# Patient Record
Sex: MALE | Race: WHITE | NOT HISPANIC OR LATINO | Employment: OTHER | URBAN - METROPOLITAN AREA
[De-identification: names, ages, dates, MRNs, and addresses within clinical notes are randomized per-mention and may not be internally consistent; named-entity substitution may affect disease eponyms.]

---

## 2019-05-01 ENCOUNTER — ANCILLARY PROCEDURE (OUTPATIENT)
Dept: ULTRASOUND IMAGING | Facility: CLINIC | Age: 64
End: 2019-05-01
Payer: COMMERCIAL

## 2019-05-01 ENCOUNTER — OFFICE VISIT (OUTPATIENT)
Dept: FAMILY MEDICINE | Facility: CLINIC | Age: 64
End: 2019-05-01
Payer: COMMERCIAL

## 2019-05-01 VITALS
TEMPERATURE: 99 F | DIASTOLIC BLOOD PRESSURE: 78 MMHG | OXYGEN SATURATION: 95 % | RESPIRATION RATE: 16 BRPM | HEART RATE: 90 BPM | WEIGHT: 198.6 LBS | SYSTOLIC BLOOD PRESSURE: 124 MMHG

## 2019-05-01 DIAGNOSIS — K21.9 GASTROESOPHAGEAL REFLUX DISEASE, ESOPHAGITIS PRESENCE NOT SPECIFIED: ICD-10-CM

## 2019-05-01 DIAGNOSIS — N50.89 MASS OF RIGHT TESTICLE: ICD-10-CM

## 2019-05-01 DIAGNOSIS — I10 ESSENTIAL HYPERTENSION: Primary | ICD-10-CM

## 2019-05-01 DIAGNOSIS — Z12.5 SCREENING FOR PROSTATE CANCER: ICD-10-CM

## 2019-05-01 DIAGNOSIS — E78.49 OTHER HYPERLIPIDEMIA: ICD-10-CM

## 2019-05-01 PROCEDURE — 99203 OFFICE O/P NEW LOW 30 MIN: CPT | Performed by: FAMILY MEDICINE

## 2019-05-01 PROCEDURE — 76870 US EXAM SCROTUM: CPT | Mod: TC | Performed by: FAMILY MEDICINE

## 2019-05-01 RX ORDER — ASPIRIN 81 MG/1
81 TABLET ORAL DAILY
COMMUNITY
End: 2019-06-04 | Stop reason: ALTCHOICE

## 2019-05-01 RX ORDER — ATORVASTATIN CALCIUM 20 MG/1
20 TABLET, FILM COATED ORAL NIGHTLY
COMMUNITY
Start: 2018-05-16 | End: 2019-06-04 | Stop reason: SDUPTHER

## 2019-05-01 RX ORDER — OMEPRAZOLE 20 MG/1
20 TABLET, DELAYED RELEASE ORAL EVERY MORNING
COMMUNITY
End: 2019-06-04 | Stop reason: SDUPTHER

## 2019-05-01 RX ORDER — PSYLLIUM HUSK 0.4 G
600 CAPSULE ORAL
COMMUNITY
End: 2020-05-20 | Stop reason: ALTCHOICE

## 2019-05-01 RX ORDER — LISINOPRIL AND HYDROCHLOROTHIAZIDE 12.5; 2 MG/1; MG/1
1 TABLET ORAL DAILY
COMMUNITY
Start: 2018-05-10 | End: 2019-06-04 | Stop reason: SDUPTHER

## 2019-05-01 ASSESSMENT — PAIN SCALES - GENERAL: PAINLEVEL: 0-NO PAIN

## 2019-05-01 NOTE — PROGRESS NOTES
Testicular ultrasounds are normal.  What we are feeling is likely just epididymis.  No further evaluation necessary

## 2019-05-01 NOTE — PROGRESS NOTES
Subjective      Presley Syed is a 63 y.o. male who presents to establish care.    HPI     Patient presents to clinic to establish care. Patient is originally from [place]. He recently moved to [place]. He has been retired for 6 years. 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 He does spend his garza in [place], [place]. He has a health history consisting of hyperlipidemia, hypertension, and GERD. He also has a history of DJD of both hips and his lower back. Patient was a former smoker and he smoked 1 PPD until 1983 when his wife told him that she was pregnant. Patient does drink alcohol and estimates that he drinks 6 alcoholic beverages per week. Patient's surgical history consists of a cholecystectomy, appendectomy, bilateral inguinal hernia repair with mesh, and right cataract surgery. Patient's father passed away in his 50's due to an industrial accident. His mom had a history of stroke and passed away in her 70's. He does have siblings with diagnoses of arthritis as well. He was diagnosed with skin cancer of his nose and had it removed. He was told that it was not melanoma. He does follow with dermatology in [place], [place] yearly.    Patient does have a concern regarding a lump on his right testicle that he noticed a few months ago. Patient denies any tenderness of his right testicle and states that it has not grown in the past few months. Patient reports that when he was in his 40's he had a lump on his left testicle and was told that he had a blood vessel that burst and dried. He denies any penile discharge, dysuria, hematuria, or pain with ejaculation. He also has been sick with a cold since Saturday. Patient's son and daughter-in-law were recently on vacation in [place] and got sick with colds when they got home. Patient went to visit them and did get sick after that. He has been coughing and experiencing upper respiratory symptoms. His son and daughter-in-law are now feeling better. Patient has  been attempting to stay hydrated. Patient wakes up experiencing nocturia at least 2 times per night.       The following have been reviewed and updated as appropriate in this visit:         Allergies not on file  Current Outpatient Medications   Medication Sig Dispense Refill   • calcium carbonate (calcium carbonate) 600 mg calcium (1,500 mg) tablet Take 600 mg by mouth     • MULTIVITAMIN ORAL Take 1 tablet by mouth     • LUTEIN ORAL Take 20 mg by mouth     • atorvastatin (LIPITOR) 20 mg tablet Take 20 mg by mouth nightly     • KRILL OIL ORAL Take 1 capsule by mouth     • lisinopril-hydrochlorothiazide (PRINZIDE,ZESTORETIC) 20-12.5 mg per tablet Take 1 tablet by mouth daily     • omeprazole OTC (PriLOSEC OTC) 20 mg EC tablet Take 20 mg by mouth every morning     • aspirin 81 mg EC tablet Take 81 mg by mouth daily       No current facility-administered medications for this visit.      Past Medical History:   Diagnosis Date   • Cancer (CMS/HCC) (HCC)     skin cancer of nose at age 42   • Cataract     right   • GERD (gastroesophageal reflux disease)    • Hemorrhoids    • Hyperlipidemia    • Hypertension      Past Surgical History:   Procedure Laterality Date   • APPENDECTOMY      age 14   • CHOLECYSTECTOMY  2015   • EYE SURGERY Right 2019    cataract   • HERNIA REPAIR Bilateral 2017     No family history on file.  Social History     Socioeconomic History   • Marital status:      Spouse name: None   • Number of children: None   • Years of education: None   • Highest education level: None   Social Needs   • Financial resource strain: None   • Food insecurity - worry: None   • Food insecurity - inability: None   • Transportation needs - medical: None   • Transportation needs - non-medical: None   Occupational History   • None   Tobacco Use   • Smoking status: Former Smoker     Packs/day: 1.00     Types: Cigarettes     Last attempt to quit:      Years since quittin.3   • Smokeless tobacco: Never Used    Substance and Sexual Activity   • Alcohol use: Yes     Comment: 6 drinks/week   • Drug use: Never   • Sexual activity: Yes     Partners: Female   Other Topics Concern   • None   Social History Narrative   • None       Review of Systems  See HPI  Objective   /78 (BP Location: Right arm, Patient Position: Sitting, Cuff Size: Reg)   Pulse 90   Temp 37.2 °C (99 °F) (Temporal)   Resp 16   Wt 90.1 kg (198 lb 9.6 oz)   SpO2 95%     Physical Exam     GEN: Alert, NAD  HEENT: normocephalic, atraumatic, EACs and TMs intact, MM moist, non-erythematous pharynx  RESP: clear lung sounds  CV: regular rate and rhythm  ABD: soft, non-tender, bowel sounds positive, no hepatosplenomegaly  : no enlarged inguinal lymph nodes, non-tender testicles, contour of testicles is normal, fullness/enlargement and increased density of right epididymis to palpation, no inguinal hernias bilaterally  PSYCH: mood is good, affect is appropriate    ASSESSMENT AND PLAN   Diagnoses and all orders for this visit:    Essential hypertension  -     CBC w/auto differential Blood, Venous; Future  -     Comprehensive metabolic panel Blood, Venous; Future    Other hyperlipidemia  -     Lipid panel Blood, Venous; Future    Gastroesophageal reflux disease, esophagitis presence not specified    Mass of right testicle  -     US testicles; Future    Screening for prostate cancer  -     PSA screen Blood, Venous; Future      1.  Essential hypertension: Blood pressure under excellent control with current medications, continue current meds  2.  History of skin cancer: Patient reports history of nonmelanoma skin cancer and follows with dermatology in [facility]  3.  Gastroesophageal reflux disease: Patient takes omeprazole 20 mg daily.  Patient reports good control of symptoms with current medication continue current meds  4.  Mass of the right testicle: Patient has fullness/enlargement to examination of the right epididymis.  No tenderness.  Will obtain  ultrasound for further evaluation of this finding  Return for [facility] in 1 month with labs as ordered prior

## 2019-05-10 ENCOUNTER — OFFICE VISIT (OUTPATIENT)
Dept: FAMILY MEDICINE | Facility: CLINIC | Age: 64
End: 2019-05-10
Payer: COMMERCIAL

## 2019-05-10 VITALS — HEART RATE: 114 BPM | DIASTOLIC BLOOD PRESSURE: 70 MMHG | OXYGEN SATURATION: 95 % | SYSTOLIC BLOOD PRESSURE: 120 MMHG

## 2019-05-10 DIAGNOSIS — J01.00 ACUTE NON-RECURRENT MAXILLARY SINUSITIS: Primary | ICD-10-CM

## 2019-05-10 PROCEDURE — 99213 OFFICE O/P EST LOW 20 MIN: CPT | Performed by: FAMILY MEDICINE

## 2019-05-10 RX ORDER — AMOXICILLIN 500 MG/1
1000 CAPSULE ORAL 2 TIMES DAILY
Qty: 30 CAPSULE | Refills: 0 | Status: SHIPPED | OUTPATIENT
Start: 2019-05-10 | End: 2019-06-04 | Stop reason: ALTCHOICE

## 2019-05-10 RX ORDER — PREDNISOLONE SODIUM PHOSPHATE 15 MG/5ML
45 SOLUTION ORAL ONCE
Status: COMPLETED | OUTPATIENT
Start: 2019-05-10 | End: 2019-05-10

## 2019-05-10 RX ADMIN — PREDNISOLONE SODIUM PHOSPHATE 45 MG: 15 SOLUTION ORAL at 11:07

## 2019-05-10 NOTE — PROGRESS NOTES
Office Visit Progress Note  Subjective      Presley Syed is a 63 y.o. male who presents for respiratory symptoms for 3 weeks.    HPI he was traveling and visited some sick family members around April 20 and felt that ever since that time he is come down with something that includes symptoms of head congestion nasal drainage postnasal drip cough phlegm.  He has not had high fevers or bad sore throat or earache.  He has no history of particular seasonal reactions and is not sneezing or itching.  He smoked a little bit many years ago but he does not have any history of asthma or emphysema.  He does get some chest discomfort from coughing so much at night on the drainage.  He feels that the Mucinex he is taken has helped clear out a little bit of the drainage but the problem does not resolve.  No recent antibiotic use.          Review of Systems  Items reviewed this visit:        Objective   /70   Pulse 114   SpO2 95%     PHYS EXAM he is alert and cooperative no acute distress.  TMs are clear and shiny.  Nasal memories are swollen and reddened with some thick white discharge.  Oropharynx looks clear.  Tender over the cheekbones.  No adenopathy in the neck.  Chest without rales or wheezes.  O2 sat is normal.  Heart rhythm regular          Assessment/Plan     Diagnoses and all orders for this visit:    Acute non-recurrent maxillary sinusitis  -     prednisoLONE (ORAPRED) 15 mg/5 mL (5 mL) solution 45 mg  -     amoxicillin (AMOXIL) 500 mg capsule; Take 2 capsules (1,000 mg total) by mouth 2 (two) times a day      Given the duration of time in the presence of some facial tenderness it seems likely that there is some sinus infection but is not resolving spontaneously.  My recommendation would be to take a course of amoxicillin thousand twice daily for a week and a dose of prednisone 45 mg once here in the clinic to try to reduce airway inflammation.  He can keep trying some symptomatic things over-the-counter  but my hope is that if we can get an underlying infection will help him to express resolution from the entire syndrome.  Recheck as needed failure to improve and consideration of a more potent antibiotic if he develops worse symptoms  SULY CHO MD

## 2019-05-31 ENCOUNTER — LAB (OUTPATIENT)
Dept: LAB | Facility: CLINIC | Age: 64
End: 2019-05-31
Payer: COMMERCIAL

## 2019-05-31 DIAGNOSIS — I10 ESSENTIAL HYPERTENSION: ICD-10-CM

## 2019-05-31 DIAGNOSIS — Z12.5 SCREENING FOR PROSTATE CANCER: ICD-10-CM

## 2019-05-31 DIAGNOSIS — E78.49 OTHER HYPERLIPIDEMIA: ICD-10-CM

## 2019-05-31 LAB
ALBUMIN SERPL-MCNC: 4.3 G/DL (ref 3.5–5.3)
ALP SERPL-CCNC: 64 U/L (ref 45–115)
ALT SERPL-CCNC: 17 U/L (ref 0–52)
ANION GAP SERPL CALC-SCNC: 14 MMOL/L (ref 3–11)
AST SERPL-CCNC: 35 U/L (ref 0–39)
BASOPHILS # BLD AUTO: 0.1 10*3/UL
BASOPHILS NFR BLD AUTO: 1 % (ref 0–2)
BILIRUB SERPL-MCNC: 0.99 MG/DL (ref 0–1.4)
BUN SERPL-MCNC: 22 MG/DL (ref 7–25)
CALCIUM ALBUM COR SERPL-MCNC: 9.4 MG/DL (ref 8.6–10.3)
CALCIUM SERPL-MCNC: 9.6 MG/DL (ref 8.6–10.3)
CHLORIDE SERPL-SCNC: 107 MMOL/L (ref 98–107)
CHOLEST SERPL-MCNC: 152 MG/DL (ref 0–199)
CO2 SERPL-SCNC: 20 MMOL/L (ref 21–32)
CREAT SERPL-MCNC: 1.12 MG/DL (ref 0.7–1.3)
EOSINOPHIL # BLD AUTO: 0.3 10*3/UL
EOSINOPHIL NFR BLD AUTO: 6 % (ref 0–3)
ERYTHROCYTE [DISTWIDTH] IN BLOOD BY AUTOMATED COUNT: 13.2 % (ref 11.5–15)
FASTING STATUS PATIENT QL REPORTED: YES
GFR SERPL CREATININE-BSD FRML MDRD: 71 ML/MIN/1.73M*2
GLUCOSE SERPL-MCNC: 96 MG/DL (ref 70–105)
HCT VFR BLD AUTO: 46.9 % (ref 38–50)
HDLC SERPL-MCNC: 64 MG/DL
HGB BLD-MCNC: 15.5 G/DL (ref 13.2–17.2)
LDLC SERPL CALC-MCNC: 74 MG/DL (ref 20–99)
LYMPHOCYTES # BLD AUTO: 1.3 10*3/UL
LYMPHOCYTES NFR BLD AUTO: 25 % (ref 15–47)
MCH RBC QN AUTO: 29.7 PG (ref 29–34)
MCHC RBC AUTO-ENTMCNC: 33 G/DL (ref 32–36)
MCV RBC AUTO: 89.8 FL (ref 82–97)
MONOCYTES # BLD AUTO: 0.5 10*3/UL
MONOCYTES NFR BLD AUTO: 9 % (ref 5–13)
NEUTROPHILS # BLD AUTO: 3.2 10*3/UL
NEUTROPHILS NFR BLD AUTO: 59 % (ref 46–70)
PLATELET # BLD AUTO: 234 10*3/UL (ref 130–350)
PMV BLD AUTO: 7.7 FL (ref 6.9–10.8)
POTASSIUM SERPL-SCNC: 3.8 MMOL/L (ref 3.5–5.1)
PROT SERPL-MCNC: 7.1 G/DL (ref 6–8.3)
PSA SERPL-MCNC: 0.34 NG/ML (ref 0–4)
RBC # BLD AUTO: 5.22 10*6/ΜL (ref 4.1–5.8)
SODIUM SERPL-SCNC: 141 MMOL/L (ref 135–145)
TRIGL SERPL-MCNC: 69 MG/DL
WBC # BLD AUTO: 5.4 10*3/UL (ref 3.7–9.6)

## 2019-05-31 PROCEDURE — 80061 LIPID PANEL: CPT | Performed by: FAMILY MEDICINE

## 2019-05-31 PROCEDURE — 36415 COLL VENOUS BLD VENIPUNCTURE: CPT | Performed by: FAMILY MEDICINE

## 2019-05-31 PROCEDURE — 85025 COMPLETE CBC W/AUTO DIFF WBC: CPT | Performed by: FAMILY MEDICINE

## 2019-05-31 PROCEDURE — 84153 ASSAY OF PSA TOTAL: CPT | Performed by: FAMILY MEDICINE

## 2019-05-31 PROCEDURE — 80053 COMPREHEN METABOLIC PANEL: CPT | Performed by: FAMILY MEDICINE

## 2019-06-04 ENCOUNTER — OFFICE VISIT (OUTPATIENT)
Dept: FAMILY MEDICINE | Facility: CLINIC | Age: 64
End: 2019-06-04
Payer: COMMERCIAL

## 2019-06-04 ENCOUNTER — LAB (OUTPATIENT)
Dept: LAB | Facility: CLINIC | Age: 64
End: 2019-06-04
Payer: COMMERCIAL

## 2019-06-04 VITALS
OXYGEN SATURATION: 97 % | SYSTOLIC BLOOD PRESSURE: 118 MMHG | HEART RATE: 84 BPM | RESPIRATION RATE: 16 BRPM | WEIGHT: 199.4 LBS | TEMPERATURE: 98.6 F | DIASTOLIC BLOOD PRESSURE: 78 MMHG

## 2019-06-04 DIAGNOSIS — R89.9 ABNORMAL LABORATORY TEST: ICD-10-CM

## 2019-06-04 DIAGNOSIS — K21.9 GASTROESOPHAGEAL REFLUX DISEASE, ESOPHAGITIS PRESENCE NOT SPECIFIED: ICD-10-CM

## 2019-06-04 DIAGNOSIS — I10 ESSENTIAL HYPERTENSION: ICD-10-CM

## 2019-06-04 DIAGNOSIS — Z00.00 ENCOUNTER FOR ROUTINE ADULT HEALTH EXAMINATION WITHOUT ABNORMAL FINDINGS: ICD-10-CM

## 2019-06-04 DIAGNOSIS — E78.49 OTHER HYPERLIPIDEMIA: ICD-10-CM

## 2019-06-04 DIAGNOSIS — R89.9 ABNORMAL LABORATORY TEST: Primary | ICD-10-CM

## 2019-06-04 LAB
ANION GAP SERPL CALC-SCNC: 9 MMOL/L (ref 3–11)
BUN SERPL-MCNC: 17 MG/DL (ref 7–25)
CALCIUM SERPL-MCNC: 9.6 MG/DL (ref 8.6–10.3)
CHLORIDE SERPL-SCNC: 106 MMOL/L (ref 98–107)
CO2 SERPL-SCNC: 26 MMOL/L (ref 21–32)
CREAT SERPL-MCNC: 1.05 MG/DL (ref 0.7–1.3)
GFR SERPL CREATININE-BSD FRML MDRD: 76 ML/MIN/1.73M*2
GLUCOSE SERPL-MCNC: 144 MG/DL (ref 70–105)
POTASSIUM SERPL-SCNC: 3.6 MMOL/L (ref 3.5–5.1)
SODIUM SERPL-SCNC: 141 MMOL/L (ref 135–145)

## 2019-06-04 PROCEDURE — 99396 PREV VISIT EST AGE 40-64: CPT | Performed by: FAMILY MEDICINE

## 2019-06-04 PROCEDURE — 80048 BASIC METABOLIC PNL TOTAL CA: CPT | Performed by: FAMILY MEDICINE

## 2019-06-04 PROCEDURE — 36415 COLL VENOUS BLD VENIPUNCTURE: CPT | Performed by: FAMILY MEDICINE

## 2019-06-04 RX ORDER — LISINOPRIL AND HYDROCHLOROTHIAZIDE 12.5; 2 MG/1; MG/1
1 TABLET ORAL DAILY
Qty: 90 TABLET | Refills: 3 | Status: SHIPPED | OUTPATIENT
Start: 2019-06-04 | End: 2020-05-20 | Stop reason: SDUPTHER

## 2019-06-04 RX ORDER — ATORVASTATIN CALCIUM 20 MG/1
20 TABLET, FILM COATED ORAL NIGHTLY
Qty: 90 TABLET | Refills: 3 | Status: SHIPPED | OUTPATIENT
Start: 2019-06-04 | End: 2020-05-20 | Stop reason: SDUPTHER

## 2019-06-04 RX ORDER — OMEPRAZOLE 20 MG/1
20 TABLET, DELAYED RELEASE ORAL EVERY MORNING
Qty: 90 TABLET | Refills: 3 | Status: SHIPPED | OUTPATIENT
Start: 2019-06-04

## 2019-06-04 ASSESSMENT — PAIN SCALES - GENERAL: PAINLEVEL: 0-NO PAIN

## 2019-06-04 NOTE — PROGRESS NOTES
Subjective      Presley Syed is a 63 y.o. male who presents for an annual physical exam.    HPI     Patient states that he is feeling well today. He is taking Prinzide for hypertension. He denies any concerns regarding his blood pressure. He states that his GERD is well-controlled with omeprazole. He did have labs drawn on 5/31/19. His eosinophils were slightly elevated at 6%. Patient does have seasonal/environmental allergies. He was also found to have CO2 of 20 mmol/L and anion gap of 14 mmol/L. He is in agreement with redrawing a BMP to check these values.    Patient's PSA resulted at 0.34 ng/mL. Patient states that his paternal uncle may have had prostate cancer. Patient experiences nocturia 1-2 times per night. Patient denies any straining to start his urinary stream. He is up-to-date on vaccinations including Tdap which was administered in 2011 per records from [place]. He is not due for a colonoscopy until next year (2020).     The following have been reviewed and updated as appropriate in this visit:         No Known Allergies  Current Outpatient Medications   Medication Sig Dispense Refill   • omeprazole OTC (PriLOSEC OTC) 20 mg EC tablet Take 1 tablet (20 mg total) by mouth every morning 90 tablet 3   • lisinopril-hydrochlorothiazide (PRINZIDE,ZESTORETIC) 20-12.5 mg per tablet Take 1 tablet by mouth daily 90 tablet 3   • calcium carbonate (calcium carbonate) 600 mg calcium (1,500 mg) tablet Take 600 mg by mouth     • MULTIVITAMIN ORAL Take 1 tablet by mouth     • LUTEIN ORAL Take 20 mg by mouth     • KRILL OIL ORAL Take 1 capsule by mouth     • atorvastatin (LIPITOR) 20 mg tablet Take 1 tablet (20 mg total) by mouth nightly 90 tablet 3     No current facility-administered medications for this visit.      Past Medical History:   Diagnosis Date   • Cancer (CMS/HCC) (HCC)     skin cancer of nose at age 42   • Cataract     right   • GERD (gastroesophageal reflux disease)    • Hemorrhoids    •  Hyperlipidemia    • Hypertension      Past Surgical History:   Procedure Laterality Date   • APPENDECTOMY      age 14   • CHOLECYSTECTOMY  2015   • EYE SURGERY Right 2019    cataract   • HERNIA REPAIR Bilateral 2017     Family History   Problem Relation Age of Onset   • Hypertension Mother    • Stroke Mother    • Arthritis Sister    • Hypertension Sister      Social History     Socioeconomic History   • Marital status:      Spouse name: Not on file   • Number of children: Not on file   • Years of education: Not on file   • Highest education level: Not on file   Occupational History   • Not on file   Social Needs   • Financial resource strain: Not on file   • Food insecurity:     Worry: Not on file     Inability: Not on file   • Transportation needs:     Medical: Not on file     Non-medical: Not on file   Tobacco Use   • Smoking status: Former Smoker     Packs/day: 1.00     Types: Cigarettes     Last attempt to quit: 1983     Years since quittin.4   • Smokeless tobacco: Never Used   Substance and Sexual Activity   • Alcohol use: Yes     Comment: 6 drinks/week   • Drug use: Never   • Sexual activity: Yes     Partners: Female   Lifestyle   • Physical activity:     Days per week: Not on file     Minutes per session: Not on file   • Stress: Not on file   Relationships   • Social connections:     Talks on phone: Not on file     Gets together: Not on file     Attends Anabaptism service: Not on file     Active member of club or organization: Not on file     Attends meetings of clubs or organizations: Not on file     Relationship status: Not on file   Other Topics Concern   • Not on file   Social History Narrative   • Not on file       Review of Systems  12 point review of systems negative  Objective   /78 (BP Location: Right arm, Patient Position: Sitting, Cuff Size: Reg)   Pulse 84   Temp 37 °C (98.6 °F) (Temporal)   Resp 16   Wt 90.4 kg (199 lb 6.4 oz)   SpO2 97%     Physical Exam     GEN: Alert,  NAD  HEENT: normocephalic, atraumatic, MM moist, EACs and TMs intact, no lymphadenopathy  RESP: clear lung sounds  CV: regular rate and rhythm  ABD: soft, non-tender, BS positive  EXT: no pretibial edema bilaterally  PSYCH: mood is good, affect is appropriate    ASSESSMENT AND PLAN   Diagnoses and all orders for this visit:    Abnormal laboratory test  -     Basic metabolic panel Blood, Venous; Future    Essential hypertension  -     Basic metabolic panel Blood, Venous; Future  -     lisinopril-hydrochlorothiazide (PRINZIDE,ZESTORETIC) 20-12.5 mg per tablet; Take 1 tablet by mouth daily    Other hyperlipidemia  -     atorvastatin (LIPITOR) 20 mg tablet; Take 1 tablet (20 mg total) by mouth nightly    Gastroesophageal reflux disease, esophagitis presence not specified  -     omeprazole OTC (PriLOSEC OTC) 20 mg EC tablet; Take 1 tablet (20 mg total) by mouth every morning    1.  Adult health examination: See HPI for healthcare maintenance details  2.  Abnormal lab findings: Patient has elevated anion gap with an low CO2 which is consistent with metabolic acidosis but history does not reveal any potential cause I recommend we recheck a BMP today and if this is persistent further evaluation will be necessary  3.  Hyperlipidemia: Lipid panel is excellent at this time with an LDL of 74.  Continue atorvastatin at 20 mg daily, 1 year refill ordered, recheck labs in 1 year  4.  Gastroesophageal reflux disease: Continue omeprazole  Follow-up: We will plan to have him back for annual physical with labs prior in 1 year  5.Essential hypertension: Blood pressure is well controlled on current medications, continue side, 1 year refill ordered

## 2020-03-01 ENCOUNTER — HEALTH MAINTENANCE LETTER (OUTPATIENT)
Age: 65
End: 2020-03-01

## 2020-05-20 ENCOUNTER — OFFICE VISIT (OUTPATIENT)
Dept: FAMILY MEDICINE | Facility: CLINIC | Age: 65
End: 2020-05-20
Payer: COMMERCIAL

## 2020-05-20 ENCOUNTER — APPOINTMENT (OUTPATIENT)
Dept: LAB | Facility: CLINIC | Age: 65
End: 2020-05-20
Payer: COMMERCIAL

## 2020-05-20 VITALS
SYSTOLIC BLOOD PRESSURE: 132 MMHG | TEMPERATURE: 97.1 F | DIASTOLIC BLOOD PRESSURE: 88 MMHG | OXYGEN SATURATION: 97 % | HEART RATE: 77 BPM | WEIGHT: 200 LBS

## 2020-05-20 DIAGNOSIS — I10 ESSENTIAL HYPERTENSION: ICD-10-CM

## 2020-05-20 DIAGNOSIS — E78.49 OTHER HYPERLIPIDEMIA: ICD-10-CM

## 2020-05-20 DIAGNOSIS — Z00.00 WELLNESS EXAMINATION: Primary | ICD-10-CM

## 2020-05-20 DIAGNOSIS — E78.49 OTHER HYPERLIPIDEMIA: Primary | ICD-10-CM

## 2020-05-20 DIAGNOSIS — Z12.5 PROSTATE CANCER SCREENING: ICD-10-CM

## 2020-05-20 DIAGNOSIS — M16.0 ARTHRITIS OF BOTH HIPS: ICD-10-CM

## 2020-05-20 LAB
ALBUMIN SERPL-MCNC: 4.4 G/DL (ref 3.5–5.3)
ALP SERPL-CCNC: 71 U/L (ref 45–115)
ALT SERPL-CCNC: 14 U/L (ref 7–52)
ANION GAP SERPL CALC-SCNC: 7 MMOL/L (ref 3–11)
AST SERPL-CCNC: 28 U/L
BILIRUB SERPL-MCNC: 0.77 MG/DL (ref 0.2–1.4)
BUN SERPL-MCNC: 16 MG/DL (ref 7–25)
CALCIUM ALBUM COR SERPL-MCNC: 8.9 MG/DL (ref 8.6–10.3)
CALCIUM SERPL-MCNC: 9.2 MG/DL (ref 8.6–10.3)
CHLORIDE SERPL-SCNC: 106 MMOL/L (ref 98–107)
CHOLEST SERPL-MCNC: 123 MG/DL (ref 0–199)
CO2 SERPL-SCNC: 28 MMOL/L (ref 21–32)
CREAT SERPL-MCNC: 0.99 MG/DL (ref 0.7–1.3)
FASTING STATUS PATIENT QL REPORTED: YES
GFR SERPL CREATININE-BSD FRML MDRD: 81 ML/MIN/1.73M*2
GLUCOSE SERPL-MCNC: 98 MG/DL (ref 70–105)
HDLC SERPL-MCNC: 53 MG/DL
LDLC SERPL CALC-MCNC: 55 MG/DL (ref 20–99)
POTASSIUM SERPL-SCNC: 3.7 MMOL/L (ref 3.5–5.1)
PROT SERPL-MCNC: 6.9 G/DL (ref 6–8.3)
PSA SERPL-MCNC: 0.74 NG/ML (ref 0–4)
SODIUM SERPL-SCNC: 141 MMOL/L (ref 135–145)
TRIGL SERPL-MCNC: 77 MG/DL

## 2020-05-20 PROCEDURE — 36415 COLL VENOUS BLD VENIPUNCTURE: CPT | Performed by: FAMILY MEDICINE

## 2020-05-20 PROCEDURE — 84153 ASSAY OF PSA TOTAL: CPT | Performed by: FAMILY MEDICINE

## 2020-05-20 PROCEDURE — 80061 LIPID PANEL: CPT | Performed by: FAMILY MEDICINE

## 2020-05-20 PROCEDURE — 99396 PREV VISIT EST AGE 40-64: CPT | Performed by: FAMILY MEDICINE

## 2020-05-20 PROCEDURE — 80053 COMPREHEN METABOLIC PANEL: CPT | Performed by: FAMILY MEDICINE

## 2020-05-20 RX ORDER — ATORVASTATIN CALCIUM 20 MG/1
20 TABLET, FILM COATED ORAL NIGHTLY
Qty: 90 TABLET | Refills: 3 | Status: SHIPPED | OUTPATIENT
Start: 2020-05-20 | End: 2021-07-09

## 2020-05-20 RX ORDER — LISINOPRIL AND HYDROCHLOROTHIAZIDE 12.5; 2 MG/1; MG/1
1 TABLET ORAL DAILY
Qty: 90 TABLET | Refills: 3 | Status: SHIPPED | OUTPATIENT
Start: 2020-05-20

## 2020-05-20 ASSESSMENT — PAIN SCALES - GENERAL: PAINLEVEL: 3

## 2020-05-20 NOTE — PATIENT INSTRUCTIONS
Health Maintenance, Male    Adopting a healthy lifestyle and getting preventive care can go a long way to promote health and wellness. Talk with your health care provider about what schedule of regular examinations is right for you. This is a good chance for you to check in with your provider about disease prevention and staying healthy.    In between checkups, there are plenty of things you can do on your own. Experts have done a lot of research about which lifestyle changes and preventive measures are most likely to keep you healthy. Ask your health care provider for more information.    Weight and diet  Eat a healthy diet  · Be sure to include plenty of vegetables, fruits, low-fat dairy products, and lean protein.  · Do not eat a lot of foods high in solid fats, added sugars, or salt.  · Get regular exercise. This is one of the most important things you can do for your health.  ? Most adults should exercise for at least 150 minutes each week. The exercise should increase your heart rate and make you sweat (moderate-intensity exercise).  ? Most adults should also do strengthening exercises at least twice a week. This is in addition to the moderate-intensity exercise.    Maintain a healthy weight  · Body mass index (BMI) is a measurement that can be used to identify possible weight problems. It estimates body fat based on height and weight. Your health care provider can help determine your BMI and help you achieve or maintain a healthy weight.  · For females 20 years of age and older:  ? A BMI below 18.5 is considered underweight.  ? A BMI of 18.5 to 24.9 is normal.  ? A BMI of 25 to 29.9 is considered overweight.  ? A BMI of 30 and above is considered obese.      Watch Your Levels of Cholesterol and Blood Lipids  · Have your blood tested for lipids and cholesterol every 5 years starting at 35 years of age. If you are at high risk for heart disease, you should start having your blood tested when you are 20  years old. You may need to have your cholesterol levels checked more often if:  ? Your lipid or cholesterol levels are high.  ? You are older than 50 years of age.  ? You are at high risk for heart disease.    What should I know about cancer screening?  Many types of cancers can be detected early and may often be prevented.  Lung Cancer  · You should be screened every year for lung cancer if:  ? You are a current smoker who has smoked for at least 30 years.  ? You are a former smoker who has quit within the past 15 years.  · Talk to your health care provider about your screening options, when you should start screening, and how often you should be screened.    Colorectal Cancer  · Routine colorectal cancer screening usually begins at 50 years of age and should be repeated every 5-10 years until you are 75 years old. You may need to be screened more often if early forms of precancerous polyps or small growths are found. Your health care provider may recommend screening at an earlier age if you have risk factors for colon cancer.  · Your health care provider may recommend using home test kits to check for hidden blood in the stool if you are normal or low risk for colon cancer. (FIT or Cologuard testing)  · A small camera at the end of a tube can be used to examine your colon (sigmoidoscopy or colonoscopy). This checks for the earliest forms of colorectal cancer.    Prostate Cancer  · Depending on your age and overall health, your health care provider may do certain tests to screen for prostate cancer  · Talk to your health care provider about any symptoms or concerns you have about  prostate cancer.    Skin Cancer  · Check your skin from head to toe regularly.  · Tell your health care provider about any new moles or changes in moles, especially if:  ? There is a change in a mole’s size, shape, or color.  ? You have a mole that is larger than a pencil eraser.  · Always use sunscreen. Apply sunscreen liberally and  repeat throughout the day.  · Protect yourself by wearing long sleeves, pants, a wide-brimmed hat, and sunglasses when outside.    What should I know about heart disease, diabetes, and high blood pressure?  · If you are 18-39 years of age, have your blood pressure checked every 3-5 years. If you are 40 years of age or older, have your blood pressure checked every year. You should have your blood pressure measured twice--once when you are at a hospital or clinic, and once when you are not at a hospital or clinic. Record the average of the two measurements. To check your blood pressure when you are not at a hospital or clinic, you can use:  ? An automated blood pressure machine at a pharmacy.  ? A home blood pressure monitor.  · Talk to your health care provider about your target blood pressure.  · If you are between 45-79 years old, ask your health care provider if you should take aspirin to prevent heart disease.  · Have regular diabetes screenings by checking your fasting blood sugar level.  ? If you are at a normal weight and have a low risk for diabetes, have this test once every three years after the age of 45.  ? If you are overweight and have a high risk for diabetes, consider being tested at a younger age or more often.  · A one-time screening for abdominal aortic aneurysm (AAA) by ultrasound is recommended for men aged 65-75 years who are current or former smokers.    What should I know about preventing infection?  Hepatitis B  If you have a higher risk for hepatitis B, you should be screened for this virus. Talk with your health care provider to find out if you are at risk for hepatitis B infection.  Hepatitis C  Blood testing is recommended for:  · Everyone born from 1945 through 1965.  · Anyone with known risk factors for hepatitis C.    Sexually Transmitted Diseases (STDs)  · You should be screened each year for STDs including gonorrhea and chlamydia if:  ? You are sexually active and are younger than 24  years of age.  ? You are older than 24 years of age and your health care provider tells you that you are at risk for this type of infection.  ? Your sexual activity has changed since you were last screened and you are at an increased risk for chlamydia or gonorrhea. Ask your health care provider if you are at risk.  · Talk with your health care provider about whether you are at high risk of being infected with HIV. Your health care provider may recommend a prescription medicine to help prevent HIV infection.    What else can I do?  · Schedule regular health, dental, and eye exams.  · Stay current with your vaccines (immunizations).  · Do not use any tobacco products, such as cigarettes, chewing tobacco, and e-cigarettes. Do not use street drugs.  If you need help quitting, ask your health care provider.  · Limit alcohol intake to no more than 2 drinks per day. One drink equals 12 ounces of beer, 5 ounces of wine, or 1½ ounces of hard liquor.  · Tell your health care provider if you often feel depressed.  · Tell your health care provider if you have ever been abused or do not feel safe at home.      [employer] Interactive Patient Education © 2018 [employer]

## 2020-05-24 NOTE — ASSESSMENT & PLAN NOTE
He has significant DJD of both hips.  He has pain, poor function, decreased range of motion.  He is contemplating replacement this fall.

## 2020-05-24 NOTE — ASSESSMENT & PLAN NOTE
Hypertension is currently treated with lisinopril hydrochlorothiazide 20/12.5 and is at goal range.  Creatinine is normal.  Continue current medication and routine home monitoring.

## 2020-05-24 NOTE — ASSESSMENT & PLAN NOTE
Hyperlipidemia is treated with Lipitor 20 and LDL is at goal.  No statin side effects noted.  Continue current treatment and recheck in 6 months.

## 2020-05-24 NOTE — PROGRESS NOTES
SUBJECTIVE:    Chief Complaint   Patient presents with   • Annual Exam   • Nail Problem     big toes curling    • Med Refill         Presley Syed is a 64 y.o. male presenting for an annual exam and review of his medical problems.      Other hyperlipidemia  Hyperlipidemia is treated with Lipitor 20 and LDL is at goal.  No statin side effects noted.  Continue current treatment and recheck in 6 months.        Essential hypertension  Hypertension is currently treated with lisinopril hydrochlorothiazide 20/12.5 and is at goal range.  Creatinine is normal.  Continue current medication and routine home monitoring.        Arthritis of both hips  He has significant DJD of both hips.  He has pain, poor function, decreased range of motion.  He is contemplating replacement this fall.          Patient Active Problem List   Diagnosis   • Arthritis of both hips   • Degeneration of lumbar or lumbosacral intervertebral disc   • Essential hypertension   • GERD (gastroesophageal reflux disease)   • Other hyperlipidemia          Outpatient Medications Prior to Visit   Medication Sig Dispense Refill   • TURMERIC-HERBAL COMPLEX NO.278 ORAL      • calcium-magnesium-zinc tablet Take by mouth     • omeprazole OTC (PriLOSEC OTC) 20 mg EC tablet Take 1 tablet (20 mg total) by mouth every morning 90 tablet 3   • KRILL OIL ORAL Take 1 capsule by mouth     • lisinopril-hydrochlorothiazide (PRINZIDE,ZESTORETIC) 20-12.5 mg per tablet Take 1 tablet by mouth daily 90 tablet 3   • atorvastatin (LIPITOR) 20 mg tablet Take 1 tablet (20 mg total) by mouth nightly 90 tablet 3   • calcium carbonate (calcium carbonate) 600 mg calcium (1,500 mg) tablet Take 600 mg by mouth     • MULTIVITAMIN ORAL Take 1 tablet by mouth     • LUTEIN ORAL Take 20 mg by mouth       No facility-administered medications prior to visit.        Family Status   Relation Name Status   • Mother Roselia Syed    • Father     • Sister Ami Syed Alive    • Brother Gene Alive   • Sister Diana Alive   • Son  Alive   • Son  Alive       Family History   Problem Relation Age of Onset   • Hypertension Mother    • Stroke Mother    • Arthritis Sister    • Hypertension Sister    • No Known Problems Brother    • No Known Problems Sister    • No Known Problems Son    • No Known Problems Son            The patient's past medical history, medications, allergies, family history and social history were updated in his electronic medical record at today's visit.      REVIEW OF SYSTEMS:  Constitutional: weight is stable.  No significant fatigue.  HEENT:  No change in vision or hearing.  Pulmonary: No dyspnea on exertion, no wheezing, no shortness of breath  Cardiovascular: No exercise intolerance, no chest pain, no diaphoresis, no edema  Gastrointestinal: No abdominal pain, no change in bowel habits, no significant GERD.  :  The patient denies urinary hesitancy, , frequency or incomplete voiding.  He has nocturia x1-2, mild ED but he declines medication.  Skin/Integumentary: Positive for both great toenail nail curvature, no onychomycosis noted.    Neurologic: No chronic headaches.  Psychiatric: Denies depression or anxiety.  Musculoskeletal: Positive for significant bilateral hip DJD.    OBJECTIVE:   /88 (BP Location: Right arm, Patient Position: Sitting, Cuff Size: Regular Adult)   Pulse 77   Temp 36.2 °C (97.1 °F) (Temporal)   Wt 90.7 kg (200 lb)   SpO2 97%    The patient appears well, in NAD. Mood and affect appropriate.  HEENT: Eyes grossly normal.  Ears normal; TM's are clear. Throat and pharynx normal. Teeth in good repair.  Neck supple. No adenopathy or thyromegaly.  Lungs: Clear, good air entry, no wheezes, rhonchi or rales.   Heart: S1 and S2 normal, no murmurs, regular rate and rhythm. Carotids are without bruits bilaterally.  Breasts without mass or axillary adenopathy.  Abdomen: Soft without tenderness, guarding, mass or hepatosplenomegaly.  No audible  renal bruit.  Extremities: No edema, no varicosities. Distal pulses intact.  He does have some curvature of both great nails, no acute ingrowing.  I advised he could see podiatry if desired.  Skin: I note only benign skin findings. No unusual rashes or suspicious skin lesions noted. Nails appear normal.   exam:  genitals normal without hernia, rectal normal, no masses, prostate mildly + enlarged, soft and symmetric without nodules.     LAB:  Recent Results (from the past 168 hour(s))   Comprehensive metabolic panel Blood, Venous    Collection Time: 05/20/20 10:11 AM   Result Value Ref Range    Sodium 141 135 - 145 mmol/L    Potassium 3.7 3.5 - 5.1 mmol/L    Chloride 106 98 - 107 mmol/L    CO2 28 21 - 32 mmol/L    Anion Gap 7 3 - 11 mmol/L    BUN 16 7 - 25 mg/dL    Creatinine 0.99 0.70 - 1.30 mg/dL    Glucose 98 70 - 105 mg/dL    Calcium 9.2 8.6 - 10.3 mg/dL    AST 28 <=40 U/L    ALT (SGPT) 14 7 - 52 U/L    Alkaline Phosphatase 71 45 - 115 U/L    Total Protein 6.9 6.0 - 8.3 g/dL    Albumin 4.4 3.5 - 5.3 g/dL    Total Bilirubin 0.77 0.20 - 1.40 mg/dL    eGFR 81 >60 mL/min/1.73m*2    Corrected Calcium 8.9 8.6 - 10.3 mg/dL   Lipid panel Blood, Venous    Collection Time: 05/20/20 10:11 AM   Result Value Ref Range    Triglycerides 77 <=149 mg/dL    Cholesterol 123 0 - 199 mg/dL    HDL 53 >=40 mg/dL    LDL Calculated 55 20 - 99 mg/dL    Fasting? Yes    PSA screen Blood, Venous    Collection Time: 05/20/20 10:11 AM   Result Value Ref Range    PSA 0.74 0.00 - 4.00 ng/mL          ASSESSMENT:    Diagnosis Plan   1. Wellness examination  PSA screen Blood, Venous   2. Essential hypertension  lisinopriL-hydrochlorothiazide (PRINZIDE,ZESTORETIC) 20-12.5 mg per tablet   3. Other hyperlipidemia  Comprehensive metabolic panel Blood, Venous    Lipid panel Blood, Venous    atorvastatin (LIPITOR) 20 mg tablet   4. Prostate cancer screening  PSA screen Blood, Venous   5. Arthritis of both hips            PLAN:   The patient is advised  to continue current medications and continue current healthy lifestyle patterns.     Health Maintenance reviewed -colonoscopy is due February 2021.  Vaccinations are up-to-date.  As are up-to-date.    Return in 1 year for complete physical with Dr. Sargent with labs prior to include CMP, lipid panel, PSA and CBC.  Anticipate he will be returning prior to that for a preop.    Nancy Jarvis MD

## 2020-07-22 ENCOUNTER — APPOINTMENT (OUTPATIENT)
Dept: ULTRASOUND IMAGING | Facility: HOSPITAL | Age: 65
End: 2020-07-22
Attending: PHYSICIAN ASSISTANT
Payer: COMMERCIAL

## 2020-07-22 ENCOUNTER — HOSPITAL ENCOUNTER (EMERGENCY)
Facility: HOSPITAL | Age: 65
Discharge: HOME | End: 2020-07-22
Attending: PHYSICIAN ASSISTANT | Admitting: PHYSICIAN ASSISTANT
Payer: COMMERCIAL

## 2020-07-22 VITALS
OXYGEN SATURATION: 97 % | RESPIRATION RATE: 16 BRPM | TEMPERATURE: 98.6 F | SYSTOLIC BLOOD PRESSURE: 126 MMHG | WEIGHT: 198.8 LBS | DIASTOLIC BLOOD PRESSURE: 79 MMHG | BODY MASS INDEX: 27.83 KG/M2 | HEIGHT: 71 IN

## 2020-07-22 DIAGNOSIS — M79.662 PAIN OF LEFT CALF: ICD-10-CM

## 2020-07-22 PROCEDURE — 93971 EXTREMITY STUDY: CPT | Mod: TC,LT

## 2020-07-22 PROCEDURE — 99214 OFFICE O/P EST MOD 30 MIN: CPT | Mod: Z6 | Performed by: PHYSICIAN ASSISTANT

## 2020-07-22 PROCEDURE — G0463 HOSPITAL OUTPT CLINIC VISIT: HCPCS | Mod: 25

## 2020-07-22 RX ORDER — ATORVASTATIN CALCIUM 20 MG/1
20 TABLET, FILM COATED ORAL
COMMUNITY
Start: 2020-05-20 | End: 2021-05-26

## 2020-07-22 ASSESSMENT — ENCOUNTER SYMPTOMS
HEADACHES: 0
SPEECH DIFFICULTY: 0

## 2020-07-22 ASSESSMENT — MIFFLIN-ST. JEOR: SCORE: 1723.88

## 2020-07-22 NOTE — ED PROVIDER NOTES
History     Chief Complaint   Patient presents with     Leg Pain     HPI  Carlos Schmidt is a 64 year old male who presents to urgent care for evaluation of left calf pain.  Patient states this started approximately 1 week ago.  He states that he is concerned about possible blood clot as he has been traveling more so recently.  He denies any specific known mechanism of injury to his left calf.  Patient denies any previous DVTs.  He denies any swelling, redness, burning, weakness, numbness of left lower extremity.    Allergies:  No Known Allergies    Problem List:    Patient Active Problem List    Diagnosis Date Noted     Visual floaters 10/01/2013     Priority: Medium     Advance care planning 10/01/2013     Priority: Medium     Advance Care Planning:   ACP Review and Resources Provided:  Reviewed chart for advance care plan.  Carlos Schmidt has no plan or code status on file. Discussed available resources and provided with information. Confirmed code status reflects current choices pending further ACP discussions.  Confirmed/documented designated decision maker(s). See permanent comments section of demographics in clinical tab.   Added by Marva Dominguez on 10/1/2013            Primary localized osteoarthrosis, pelvic region and thigh 05/13/2013     Priority: Medium     DDD (degenerative disc disease), lumbar 05/13/2013     Priority: Medium     CARDIOVASCULAR SCREENING; LDL GOAL LESS THAN 160 10/31/2010     Priority: Medium     HTN (hypertension) 03/08/2010     Priority: Medium        Past Medical History:    Past Medical History:   Diagnosis Date     CARDIOVASCULAR SCREENING; LDL GOAL LESS THAN 160 10/31/2010     HTN (hypertension) 3/8/2010     LBP (low back pain) 7/26/2012       Past Surgical History:    No past surgical history on file.    Family History:    Family History   Problem Relation Age of Onset     Cerebrovascular Disease Mother      Hypertension Mother      Alcohol/Drug Brother       "Gynecology Sister        Social History:  Marital Status:   [2]  Social History     Tobacco Use     Smoking status: Former Smoker     Types: Cigarettes     Last attempt to quit: 1983     Years since quittin.5     Smokeless tobacco: Never Used   Substance Use Topics     Alcohol use: Yes     Comment: rarely     Drug use: No        Medications:    atorvastatin (LIPITOR) 20 MG tablet  Calcium-Magnesium-Zinc ([facility] CALCIUM-MAGNESIUM-ZINC) 333-133-5 MG TABS per tablet  PRILOSEC OTC 20 MG OR TBEC  ibuprofen (ADVIL,MOTRIN) 600 MG tablet          Review of Systems   Musculoskeletal:        See HPI   Neurological: Negative for speech difficulty and headaches.   All other systems reviewed and are negative.      Physical Exam   BP: 126/79  Heart Rate: 91  Temp: 98.6  F (37  C)  Resp: 16  Height: 180.3 cm (5' 11\")  Weight: 90.2 kg (198 lb 12.8 oz)  SpO2: 97 %      Physical Exam  Vitals signs and nursing note reviewed.   Constitutional:       General: He is not in acute distress.     Appearance: Normal appearance. He is not ill-appearing.   Cardiovascular:      Rate and Rhythm: Regular rhythm.      Heart sounds: Normal heart sounds.   Pulmonary:      Breath sounds: Normal breath sounds.   Musculoskeletal:      Left lower leg: He exhibits tenderness. He exhibits no bony tenderness, no swelling, no deformity and no laceration. No edema.        Legs:       Comments: Patient is tender with palpation of lower aspect of left calf.  There is no obvious erythema, swelling, wound, fluctuance, induration appreciated.  Negative Homans sign   Neurological:      Mental Status: He is alert and oriented to person, place, and time.         ED Course        Procedures              Critical Care time:               Results for orders placed or performed during the hospital encounter of 20 (from the past 24 hour(s))   US Lower Extremity Venous Duplex Left    Narrative    PROCEDURE: US LOWER EXTREMITY VENOUS DUPLEX LEFT 2020 " 11:20 AM    HISTORY: pain in calf x1week. Recently drove long distance. no injury    COMPARISONS: None.    TECHNIQUE: Grayscale and color Doppler evaluation.    FINDINGS: There is no deep venous thrombosis. Veins are normally  compressible with normal phasicity and augmentation.         Impression    IMPRESSION: No DVT.    CARISSA DAVALOS MD       Medications - No data to display    Assessments & Plan (with Medical Decision Making)   1.  Calf pain, left    Discussed exam findings with patient along with ultrasound results and reassurance is given that there is no DVT at this time.  We discussed appropriate calf stretching today in clinic along with appropriate dosing of Tylenol and ibuprofen for pain.  We discussed warning signs for possible DVT in the future.  Any further concerns please return to emergency department.  Patient verbalizes understanding and agreement of plan.      I have reviewed the nursing notes.    I have reviewed the findings, diagnosis, plan and need for follow up with the patient.      Discharge Medication List as of 7/22/2020 11:43 AM          Final diagnoses:   Pain of left calf       7/22/2020   [facility]     Baakri Douglas PA-C  07/22/20 114

## 2020-07-22 NOTE — ED AVS SNAPSHOT
[facility]  [address]  [place] [address]  Phone:  [phone]                                    Carlos Schmidt   MRN: [id number]    Department:  [facility]   Date of Visit:  7/22/2020           After Visit Summary Signature Page    I have received my discharge instructions, and my questions have been answered. I have discussed any challenges I see with this plan with the nurse or doctor.    ..........................................................................................................................................  Patient/Patient Representative Signature      ..........................................................................................................................................  Patient Representative Print Name and Relationship to Patient    ..................................................               ................................................  Date                                   Time    ..........................................................................................................................................  Reviewed by Signature/Title    ...................................................              ..............................................  Date                                               Time          22EPIC Rev 08/18

## 2020-07-22 NOTE — ED TRIAGE NOTES
Pt presents today with c/o left calf pain for 1 week, worse today per pt. Pt describes pain as an ache. Pt tried Advil yesterday, no relief so he didn't take anymore pain relief medications. PT states he has been traveling recently, b ut states he gets out of the vehicle every 2 hours to walk around. No history of blood clots. Pt was taken off ASA 81mg about 2 years ago.

## 2020-07-22 NOTE — ED TRIAGE NOTES
"Patient presents with two weeks of left calf pain.  States \"it aches\" and today it is much worse.  CMS intact.  Notes he has been doing a lot of traveling.  Denies any history of clots.  "

## 2020-12-13 ENCOUNTER — HEALTH MAINTENANCE LETTER (OUTPATIENT)
Age: 65
End: 2020-12-13

## 2021-04-17 ENCOUNTER — HEALTH MAINTENANCE LETTER (OUTPATIENT)
Age: 66
End: 2021-04-17

## 2021-07-09 ENCOUNTER — TELEPHONE (OUTPATIENT)
Dept: FAMILY MEDICINE | Facility: CLINIC | Age: 66
End: 2021-07-09

## 2021-07-09 DIAGNOSIS — E78.49 OTHER HYPERLIPIDEMIA: ICD-10-CM

## 2021-07-09 RX ORDER — ATORVASTATIN CALCIUM 20 MG/1
TABLET, FILM COATED ORAL
Qty: 30 TABLET | Refills: 0 | Status: SHIPPED | OUTPATIENT
Start: 2021-07-09

## 2021-07-09 NOTE — TELEPHONE ENCOUNTER
Care Due:                  Date            Visit Type   Department     Provider  --------------------------------------------------------------------------------                                           [facility]  Last Visit: 05-      PHYSICAL     MEDICINE       DASHAWN LOAIZA  Next Visit: None Scheduled  None         None Found                                                            Last  Test          Frequency    Reason                     Performed    Due Date  --------------------------------------------------------------------------------  Office Visit  12 months..  atorvastatin,              05-   05-                             lisinopriL-hydrochlorothi                             azide....................    BMP.........  12 months..  lisinopriL-hydrochlorothi  Not Found    Overdue                             azide....................    Lipid Panel.  12 months..  atorvastatin.............  05-   05-    Powered by [employer]. Reference number: [id number]. 7/09/2021 4:50:36 AM JOE

## 2021-07-09 NOTE — TELEPHONE ENCOUNTER
Courtesy 30-day supply refill provided to patient.  He is overdue for a physical exam.  Please contact patient to set up an annual physical exam with fasting labs prior.  Thank you.

## 2021-07-11 DIAGNOSIS — E78.49 OTHER HYPERLIPIDEMIA: ICD-10-CM

## 2021-07-11 RX ORDER — ATORVASTATIN CALCIUM 20 MG/1
TABLET, FILM COATED ORAL
Qty: 90 TABLET | OUTPATIENT
Start: 2021-07-11

## 2021-07-11 NOTE — TELEPHONE ENCOUNTER
No new care gaps identified.  Powered by [employer]. Reference number: [id number]. 7/11/2021 9:28:53 AM JOE

## 2021-10-02 ENCOUNTER — HEALTH MAINTENANCE LETTER (OUTPATIENT)
Age: 66
End: 2021-10-02

## 2022-05-08 ENCOUNTER — HEALTH MAINTENANCE LETTER (OUTPATIENT)
Age: 67
End: 2022-05-08

## 2023-01-14 ENCOUNTER — HEALTH MAINTENANCE LETTER (OUTPATIENT)
Age: 68
End: 2023-01-14

## 2023-04-23 ENCOUNTER — HEALTH MAINTENANCE LETTER (OUTPATIENT)
Age: 68
End: 2023-04-23

## 2024-11-04 ENCOUNTER — OFFICE VISIT (OUTPATIENT)
Dept: URBAN - METROPOLITAN AREA CLINIC 26 | Facility: CLINIC | Age: 69
End: 2024-11-04
Payer: MEDICARE

## 2024-11-04 DIAGNOSIS — H35.371 PUCKERING OF MACULA, RIGHT EYE: ICD-10-CM

## 2024-11-04 DIAGNOSIS — H04.123 DRY EYE SYNDROME OF BILATERAL LACRIMAL GLANDS: Primary | ICD-10-CM

## 2024-11-04 DIAGNOSIS — H52.4 PRESBYOPIA: ICD-10-CM

## 2024-11-04 PROCEDURE — 92014 COMPRE OPH EXAM EST PT 1/>: CPT | Performed by: OPTOMETRIST

## 2024-11-04 PROCEDURE — 92134 CPTRZ OPH DX IMG PST SGM RTA: CPT | Performed by: OPTOMETRIST

## 2024-11-04 ASSESSMENT — INTRAOCULAR PRESSURE
OD: 15
OS: 15

## 2024-11-04 ASSESSMENT — VISUAL ACUITY
OS: 20/20
OD: 20/20

## 2024-11-04 ASSESSMENT — KERATOMETRY
OD: 43.00
OS: 43.25